# Patient Record
Sex: FEMALE | Race: WHITE | NOT HISPANIC OR LATINO | Employment: STUDENT | URBAN - NONMETROPOLITAN AREA
[De-identification: names, ages, dates, MRNs, and addresses within clinical notes are randomized per-mention and may not be internally consistent; named-entity substitution may affect disease eponyms.]

---
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
## 2025-01-27 DIAGNOSIS — K59.00 CONSTIPATION, UNSPECIFIED CONSTIPATION TYPE: ICD-10-CM

## 2025-01-27 DIAGNOSIS — N30.00 ACUTE CYSTITIS WITHOUT HEMATURIA: Primary | ICD-10-CM

## 2025-01-27 RX ORDER — DOCUSATE SODIUM 100 MG/1
100 CAPSULE, LIQUID FILLED ORAL 2 TIMES DAILY
Qty: 60 CAPSULE | Refills: 0 | Status: SHIPPED | OUTPATIENT
Start: 2025-01-27

## 2025-01-27 RX ORDER — NITROFURANTOIN 25; 75 MG/1; MG/1
100 CAPSULE ORAL 2 TIMES DAILY
Qty: 14 CAPSULE | Refills: 0 | Status: SHIPPED | OUTPATIENT
Start: 2025-01-27 | End: 2025-02-03

## 2025-02-08 ASSESSMENT — ENCOUNTER SYMPTOMS
ABDOMINAL PAIN: 0
EYES NEGATIVE: 1
SHORTNESS OF BREATH: 0
DIARRHEA: 0
PALPITATIONS: 0
COUGH: 0
PSYCHIATRIC NEGATIVE: 1
SLEEP DISTURBANCE: 0
NERVOUS/ANXIOUS: 0
GASTROINTESTINAL NEGATIVE: 1
RESPIRATORY NEGATIVE: 1
CONSTIPATION: 0

## 2025-02-09 NOTE — PATIENT INSTRUCTIONS
It was so nice to see you today, Magui!     I have sent in Junel for birth control  I have also sent over Mounjaro. If we cannot get this approved, other options would be   Qsymia  Adipex  Contrave  Metformin with Topamax    The other thing you could do too, would be to call your insurance to see what they would approve.     Keep in mind, the injection can decrease effectiveness of Birth Control.       Follow up in 3 months

## 2025-02-09 NOTE — PROGRESS NOTES
Patient ID:   Magui Domingo is a 19 y.o. female with a PMH of migraines and obesity who presents to the office today for a follow up and to discuss weight loss options as well as birth control options.    Migraine, Weight Loss, and Contraception.    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     Migraines  Magui reported at her last visit, that she first started having migraines around age 15 and she was having them once a month.   As she has gotten older they have increased in frequency and severity. Typically, her headaches are on the right side of her head and rates it a 9/10 at its worse. She explains that they have been so bad that she cries. She will get phonophobia and photophobia as well as nausea but has not vomited.   She does not get auras, but will on occasion experience dizziness prior to the onset of her migraines.  Previously tried tylenol and recently started Excedrin Migraine but has not been helpful.   She does have Nexplanon implant.   She previously stated that she had interest in doing as preventative therapy versus abortive therapy due to the nature and progression of her headaches.  At her last visit with me, we started her on Metoprolol 50 mg BID. She was to follow up in 4-6 weeks to discuss the outcome and if this was helping, but she did not.   -> today she reports today that her headaches have improved and she has stopped taking the Metoprolol. She reports that she has not had any bad headaches since Decemeber. She does endorse  that she will have headaches every once in a while and Tylenol and Ibuprofen are helping.   She did however send [facility] messages about wanting to possibly switch her BC from Nexplanon to a different method.     Oligomenorrhea   Periods discussed today and she is still having irregular periods. She is trying to have it removed soon, as it is overdue (overdue in Oct/Nov 2024)  She is currently sexually active and sometimes using protection   According to the [facility] [place] [facility]; CHC is a level 2 risk which is low.     Will start Junel and she will call to have her Nexplanon removed, or replaced, which ever she decides to do.      Weight gain  She is also concerned with her recent weight gain.   She states that before she had the Nexplanon she was 184#. She has now had it for three plus years and she is at 231#   She has tried meal planning but because her schedule very erratic and it makes it hard for her to meal plan and get to the gym   She has utilized apps to help track calories and has not been successful with her weight loss.   She states this weight gain has affected her mental health to a degree.   We discussed medication therapies to aid in the help with weight loss.   She is not interested in a stimulants at this time due to potential side effects and already having anxiety   Would like to try an injectable while she continues to make healthier choices.             Social History     Tobacco Use    Smoking status: Never    Smokeless tobacco: Never   Vaping Use    Vaping status: Never Used   Substance Use Topics    Alcohol use: Never    Drug use: Never       There is no immunization history on file for this patient.    Review of Systems   Constitutional:  Positive for unexpected weight change. Negative for activity change and appetite change.   HENT: Negative.     Eyes: Negative.    Respiratory: Negative.  Negative for cough and shortness of breath.    Cardiovascular:  Negative for chest pain and palpitations.  "  Gastrointestinal: Negative.  Negative for abdominal pain, constipation and diarrhea.   Genitourinary: Negative.    Neurological: Negative.  Negative for dizziness and headaches.   Psychiatric/Behavioral: Negative.  Negative for sleep disturbance. The patient is not nervous/anxious.        Allergies   Allergen Reactions    Codeine Other and Shortness of breath     Not sure    Penicillin Hives and Other     Not sure        Visit Vitals  /78 (BP Location: Left arm)   Pulse 86   Ht 1.6 m (5' 3\")   Wt 104 kg (230 lb)   SpO2 98%   BMI 40.74 kg/m²   Smoking Status Never   BSA 2.15 m²       Physical Exam  Constitutional:       Appearance: Normal appearance. She is obese.   HENT:      Head: Normocephalic.   Cardiovascular:      Rate and Rhythm: Normal rate and regular rhythm.      Pulses: Normal pulses.      Heart sounds: Normal heart sounds. No murmur heard.     No friction rub. No gallop.   Pulmonary:      Effort: Pulmonary effort is normal.      Breath sounds: Normal breath sounds. No wheezing, rhonchi or rales.   Abdominal:      General: Bowel sounds are normal. There is no distension.      Palpations: Abdomen is soft.      Tenderness: There is no abdominal tenderness.   Musculoskeletal:         General: Normal range of motion.      Cervical back: Normal range of motion.   Skin:     General: Skin is warm.   Neurological:      General: No focal deficit present.      Mental Status: She is alert and oriented to person, place, and time.      Sensory: No sensory deficit.      Motor: No weakness.      Gait: Gait normal.   Psychiatric:         Mood and Affect: Mood normal.         Behavior: Behavior normal.         Current Outpatient Medications   Medication Instructions    etonogestrel-eluting contraceptive (Nexplanon) 68 mg implant implant 1 each, Once    Mounjaro 5 mg, subcutaneous, Weekly    norethindrone-e.estradioL-iron (Microgestin FE 1/20, 28,) 1 mg-20 mcg (21)/75 mg (7) tablet 1 tablet, oral, Daily       Lab " Results   Component Value Date    WBC 6.8 10/09/2024    HGB 13.9 10/09/2024    HCT 42.8 10/09/2024     10/09/2024    CHOL 136 10/09/2024    TRIG 86 10/09/2024    HDL 43.1 10/09/2024    ALT 18 10/09/2024    AST 19 10/09/2024     10/09/2024    K 4.1 10/09/2024     10/09/2024    CREATININE 0.69 10/09/2024    BUN 9 10/09/2024    CO2 26 10/09/2024    TSH 1.96 10/09/2024    HGBA1C 5.4 10/09/2024       ASSESSMENT AND PLAN:  Assessment/Plan   Problem List Items Addressed This Visit             ICD-10-CM    Migraine without aura G43.009     Headaches have subsided and she is able to manage them with Tylenol or Ibuprofen.   -Will stop Metoprolol  -Will continue to monitor         Class 3 severe obesity due to excess calories with body mass index (BMI) of 40.0 to 44.9 in adult E66.813, Z68.41, E66.01     before she had the Nexplanon she was 184#. She has now had it for three plus years and she is at 231#   -She has tried meal planning but because her schedule very erratic and it makes it hard for her to meal plan and get to the gym   She has utilized apps to help track calories and has not been successful with her weight loss.   She states this weight gain has affected her mental health to a degree.   We discussed medication therapies to aid in the help with weight loss.   She is not interested in a stimulants at this time due to potential side effects and already having anxiety   - Would like to try an injectable while she continues to make healthier choices.   -discussed the decreased effectiveness of the OCP with GLP-1 injections.   -Discussed alternatives to the injection if the GLP-1 is not covered.   -She will also call her insurance to see what will be covered           Other Visit Diagnoses         Codes    Encounter for initial prescription of contraceptive pills    -  Primary Z30.011    Relevant Medications    norethindrone-e.estradioL-iron (Microgestin FE 1/20, 28,) 1 mg-20 mcg (21)/75 mg (7)  tablet    Class 3 severe obesity without serious comorbidity with body mass index (BMI) of 40.0 to 44.9 in adult, unspecified obesity type     E66.813, E66.01, Z68.41    Relevant Medications    tirzepatide (Mounjaro) 5 mg/0.5 mL pen injector    Anxiety     F41.9    Relevant Medications    tirzepatide (Mounjaro) 5 mg/0.5 mL pen injector            Assessment/Plan   --------------------  Start Junel  Call about Nexplanon removal / replacement  Start Mounjaro  Options for alternatives     Follow up in 3 months

## 2025-02-11 ENCOUNTER — APPOINTMENT (OUTPATIENT)
Dept: PRIMARY CARE | Facility: CLINIC | Age: 20
End: 2025-02-11
Payer: COMMERCIAL

## 2025-02-11 VITALS
WEIGHT: 230 LBS | OXYGEN SATURATION: 98 % | BODY MASS INDEX: 40.75 KG/M2 | HEIGHT: 63 IN | HEART RATE: 86 BPM | SYSTOLIC BLOOD PRESSURE: 116 MMHG | DIASTOLIC BLOOD PRESSURE: 78 MMHG

## 2025-02-11 DIAGNOSIS — G43.009 MIGRAINE WITHOUT AURA AND WITHOUT STATUS MIGRAINOSUS, NOT INTRACTABLE: ICD-10-CM

## 2025-02-11 DIAGNOSIS — F41.9 ANXIETY: ICD-10-CM

## 2025-02-11 DIAGNOSIS — E66.813 CLASS 3 SEVERE OBESITY WITHOUT SERIOUS COMORBIDITY WITH BODY MASS INDEX (BMI) OF 40.0 TO 44.9 IN ADULT, UNSPECIFIED OBESITY TYPE: ICD-10-CM

## 2025-02-11 DIAGNOSIS — E66.01 CLASS 3 SEVERE OBESITY DUE TO EXCESS CALORIES WITHOUT SERIOUS COMORBIDITY WITH BODY MASS INDEX (BMI) OF 40.0 TO 44.9 IN ADULT: ICD-10-CM

## 2025-02-11 DIAGNOSIS — E66.01 CLASS 3 SEVERE OBESITY WITHOUT SERIOUS COMORBIDITY WITH BODY MASS INDEX (BMI) OF 40.0 TO 44.9 IN ADULT, UNSPECIFIED OBESITY TYPE: ICD-10-CM

## 2025-02-11 DIAGNOSIS — E66.813 CLASS 3 SEVERE OBESITY DUE TO EXCESS CALORIES WITHOUT SERIOUS COMORBIDITY WITH BODY MASS INDEX (BMI) OF 40.0 TO 44.9 IN ADULT: ICD-10-CM

## 2025-02-11 DIAGNOSIS — Z30.011 ENCOUNTER FOR INITIAL PRESCRIPTION OF CONTRACEPTIVE PILLS: Primary | ICD-10-CM

## 2025-02-11 PROBLEM — N92.6 IRREGULAR PERIODS: Status: ACTIVE | Noted: 2025-02-11

## 2025-02-11 PROBLEM — N91.3 PRIMARY OLIGOMENORRHEA: Status: ACTIVE | Noted: 2025-02-11

## 2025-02-11 PROCEDURE — 99213 OFFICE O/P EST LOW 20 MIN: CPT

## 2025-02-11 PROCEDURE — 3008F BODY MASS INDEX DOCD: CPT

## 2025-02-11 PROCEDURE — 1036F TOBACCO NON-USER: CPT

## 2025-02-11 RX ORDER — NORETHINDRONE ACETATE AND ETHINYL ESTRADIOL 1MG-20(21)
1 KIT ORAL DAILY
Qty: 28 TABLET | Refills: 12 | Status: SHIPPED | OUTPATIENT
Start: 2025-02-11 | End: 2026-02-11

## 2025-02-11 RX ORDER — TIRZEPATIDE 5 MG/.5ML
5 INJECTION, SOLUTION SUBCUTANEOUS WEEKLY
Qty: 3 ML | Refills: 0 | Status: SHIPPED | OUTPATIENT
Start: 2025-02-11

## 2025-02-11 ASSESSMENT — ENCOUNTER SYMPTOMS
APPETITE CHANGE: 0
UNEXPECTED WEIGHT CHANGE: 1
NEUROLOGICAL NEGATIVE: 1
DIZZINESS: 0
ACTIVITY CHANGE: 0
HEADACHES: 0

## 2025-02-11 ASSESSMENT — PATIENT HEALTH QUESTIONNAIRE - PHQ9
2. FEELING DOWN, DEPRESSED OR HOPELESS: NOT AT ALL
SUM OF ALL RESPONSES TO PHQ9 QUESTIONS 1 AND 2: 0
1. LITTLE INTEREST OR PLEASURE IN DOING THINGS: NOT AT ALL

## 2025-02-11 ASSESSMENT — PAIN SCALES - GENERAL: PAINLEVEL_OUTOF10: 0-NO PAIN

## 2025-02-11 NOTE — ASSESSMENT & PLAN NOTE
Currently has Nexplanon but is overdue for removal (due to be taken out in October/November of 2024).   She is sexually active and sometimes uses condoms.   Denies wanting a pregnancy at this time  -Will start Junel until she is able to make a follow up appointment to decide if she wishes to continue with Nexplanon or not  -Follow up in 3 months

## 2025-02-11 NOTE — ASSESSMENT & PLAN NOTE
before she had the Nexplanon she was 184#. She has now had it for three plus years and she is at 231#   -She has tried meal planning but because her schedule very erratic and it makes it hard for her to meal plan and get to the gym   She has utilized apps to help track calories and has not been successful with her weight loss.   She states this weight gain has affected her mental health to a degree.   We discussed medication therapies to aid in the help with weight loss.   She is not interested in a stimulants at this time due to potential side effects and already having anxiety   - Would like to try an injectable while she continues to make healthier choices.   -discussed the decreased effectiveness of the OCP with GLP-1 injections.   -Discussed alternatives to the injection if the GLP-1 is not covered.   -She will also call her insurance to see what will be covered

## 2025-02-11 NOTE — ASSESSMENT & PLAN NOTE
Headaches have subsided and she is able to manage them with Tylenol or Ibuprofen.   -Will stop Metoprolol  -Will continue to monitor

## 2025-02-18 DIAGNOSIS — E66.813 CLASS 3 SEVERE OBESITY DUE TO EXCESS CALORIES WITHOUT SERIOUS COMORBIDITY WITH BODY MASS INDEX (BMI) OF 40.0 TO 44.9 IN ADULT: Primary | ICD-10-CM

## 2025-02-18 DIAGNOSIS — E66.01 CLASS 3 SEVERE OBESITY DUE TO EXCESS CALORIES WITHOUT SERIOUS COMORBIDITY WITH BODY MASS INDEX (BMI) OF 40.0 TO 44.9 IN ADULT: Primary | ICD-10-CM

## 2025-02-19 DIAGNOSIS — E66.01 CLASS 3 SEVERE OBESITY DUE TO EXCESS CALORIES WITHOUT SERIOUS COMORBIDITY WITH BODY MASS INDEX (BMI) OF 40.0 TO 44.9 IN ADULT: Primary | ICD-10-CM

## 2025-02-19 DIAGNOSIS — E66.813 CLASS 3 SEVERE OBESITY DUE TO EXCESS CALORIES WITHOUT SERIOUS COMORBIDITY WITH BODY MASS INDEX (BMI) OF 40.0 TO 44.9 IN ADULT: Primary | ICD-10-CM

## 2025-02-20 DIAGNOSIS — Z13.9 SCREENING DUE: ICD-10-CM

## 2025-02-21 LAB
AMPHETAMINES UR QL: NEGATIVE NG/ML
BARBITURATES UR QL: NEGATIVE NG/ML
BENZODIAZ UR QL: NEGATIVE NG/ML
BZE UR QL: NEGATIVE NG/ML
CREAT UR-MCNC: 201.4 MG/DL
METHADONE UR QL: NEGATIVE NG/ML
OPIATES UR QL: NEGATIVE NG/ML
OXIDANTS UR QL: NEGATIVE MCG/ML
OXYCODONE UR QL: NEGATIVE NG/ML
PCP UR QL: NEGATIVE NG/ML
PH UR: 6.3 [PH] (ref 4.5–9)
QUEST NOTES AND COMMENTS: NORMAL
THC UR QL: NEGATIVE NG/ML

## 2025-02-24 RX ORDER — PHENTERMINE HYDROCHLORIDE 37.5 MG/1
37.5 TABLET ORAL
Qty: 30 TABLET | Refills: 0 | Status: SHIPPED | OUTPATIENT
Start: 2025-02-24 | End: 2025-03-26

## 2025-03-18 ASSESSMENT — PROMIS GLOBAL HEALTH SCALE
RATE_GENERAL_HEALTH: FAIR
CARRYOUT_PHYSICAL_ACTIVITIES: MODERATELY
RATE_AVERAGE_PAIN: 0
RATE_AVERAGE_FATIGUE: SEVERE
RATE_QUALITY_OF_LIFE: GOOD
CARRYOUT_SOCIAL_ACTIVITIES: GOOD
RATE_SOCIAL_SATISFACTION: GOOD
EMOTIONAL_PROBLEMS: SOMETIMES
RATE_MENTAL_HEALTH: GOOD
RATE_PHYSICAL_HEALTH: FAIR

## 2025-03-24 ASSESSMENT — ENCOUNTER SYMPTOMS
HEADACHES: 0
COUGH: 0
EYES NEGATIVE: 1
RESPIRATORY NEGATIVE: 1
ABDOMINAL PAIN: 0
SHORTNESS OF BREATH: 0
DIARRHEA: 0
PSYCHIATRIC NEGATIVE: 1
GASTROINTESTINAL NEGATIVE: 1
CONSTIPATION: 0
NERVOUS/ANXIOUS: 0
APPETITE CHANGE: 0
DIZZINESS: 0
SLEEP DISTURBANCE: 0
PALPITATIONS: 0
ACTIVITY CHANGE: 0
NEUROLOGICAL NEGATIVE: 1

## 2025-03-24 NOTE — PATIENT INSTRUCTIONS
It was so nice seeing you today, Magui!     Please call about having Nexplanon removed / or replaced.   I have placed a referral to Lyn Granados in [place].   Continue with Junel.     Continue with Adipex.   Some tips and tricks to help with weight loss:   - eat off the smaller plate (like the salad plate)  - half the plate should be vegetables, 1/4 protein, 1/4 carbs - for lunch and dinner  -  discussed dietary changes including proper protein intake, increase vegetable intake, fruit intake, low carbohydrate intake, and no processed food intake.  - discussed meal prepping    put your fork down between bites  - drink at least 64 oz of water day.  do not consume large amounts of water with your meal  - do not drink sugary drinks such as pop or specialty coffee drinks  -  eat your vegetables and protein first.  Have vegetables at lunch and dinner  - discussed with patient to increase activity 4 days a week preferably 5. Exercise should be done 5 days a week including walking for 20-30 minutes each day. Work up to this goal.  If you have not been exercising then start with a 10-15 minute walk most days of the week -  keep log of calorie intake and food intake and bring with you to next appointment.You can use an evelia on your phone such as my fitness pal.  - discussed with patient using activity trackers such as a fit bit. log  activity daily and bring  activity log at next visit  - increase your protein intake - should have at least 5-6 servings of protein per day  - decrease carb intake - discussed carb serving size and to read packages  - limit carb servings to 4 servings a day  -1500-calorie meal plan     Follow up in 1 month with Luz

## 2025-03-24 NOTE — PROGRESS NOTES
Patient ID:   Magui Domingo is a 19 y.o. female with a PMH of migraines and obesity who presents to the office today for a follow up for Adipex   Weight Check.    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   Today she reports that she is considering looking for a new job because of the swing shift schedule and she feels like she has no time for herself and to care for herself.       Oligomenorrhea   She previously had Nexplanon placed through [facility], but she is due for it to be removed.  (overdue in Oct/Nov 2024).   She is currently sexually active and sometimes using protection and she does not want to become pregnant.   According to the [facility]; CHC is a level 2 risk which is low.   We discussed OCP and she is agreeable to it to avoid pregnancy and also to help her periods become more regular again. She was started on Junel.     Today she reports that she feels like she has been noticing that she is more emotional.   We discussed, giving the pill a few more months to regulate and become stable which she is agreeable to.   -> C/W Junel for now  -> Referral placed to GYN for Nexplanon removal and possible reinsertion.     Weight gain  At Magui's last visit with me, she reported her concerns about her weight gain.   She stated that before she had her Nexplanon placed, she was 184#. She has now had it for three plus years and she is at 231#   She has tried meal planning but because her schedule very erratic and it makes it hard for her  "to meal plan and get to the gym   She has utilized apps to help track calories and has not been successful with her weight loss.   She states this weight gain has affected her mental health to a degree.   We discussed medication therapies to aid in the help with weight loss.   She stated she would like to try an injectable while she continues to make healthier choices, but these were not covered. Also not covered was Contrave or Qsymia.    -She was started on Adipex in February.   Weight before startin lbs.  BP before startin/78      Weight today: 211 lbs.   BP today 132/86     Today, Magui reports she is so happy to finally be under 220 pounds.   -> She denies side effects and would like to continue with Adipex.   -> Will continue with Adipex and follow up in 1 month         Social History     Tobacco Use    Smoking status: Never    Smokeless tobacco: Never   Vaping Use    Vaping status: Never Used   Substance Use Topics    Alcohol use: Never    Drug use: Never       There is no immunization history on file for this patient.    Review of Systems   Constitutional:  Negative for activity change and appetite change.   HENT: Negative.     Eyes: Negative.    Respiratory: Negative.  Negative for cough and shortness of breath.    Cardiovascular:  Negative for chest pain and palpitations.   Gastrointestinal: Negative.  Negative for abdominal pain, constipation and diarrhea.   Genitourinary: Negative.    Neurological: Negative.  Negative for dizziness and headaches.   Psychiatric/Behavioral: Negative.  Negative for sleep disturbance. The patient is not nervous/anxious.        Allergies   Allergen Reactions    Codeine Other and Shortness of breath     Not sure    Penicillin Hives and Other     Not sure        Visit Vitals  /86 (BP Location: Right arm)   Pulse 100   Temp 36.2 °C (97.2 °F) (Temporal)   Resp 16   Ht 1.6 m (5' 3\")   Wt 95.8 kg (211 lb 3.2 oz)   SpO2 98%   BMI 37.41 kg/m²   OB Status Having " periods   Smoking Status Never   BSA 2.06 m²       Physical Exam  Constitutional:       Appearance: Normal appearance. She is obese.   HENT:      Head: Normocephalic.   Cardiovascular:      Rate and Rhythm: Normal rate and regular rhythm.      Pulses: Normal pulses.      Heart sounds: Normal heart sounds. No murmur heard.     No friction rub. No gallop.   Pulmonary:      Effort: Pulmonary effort is normal.      Breath sounds: Normal breath sounds. No wheezing, rhonchi or rales.   Abdominal:      General: Bowel sounds are normal. There is no distension.      Palpations: Abdomen is soft.      Tenderness: There is no abdominal tenderness.   Musculoskeletal:         General: Normal range of motion.      Cervical back: Normal range of motion.   Skin:     General: Skin is warm.   Neurological:      General: No focal deficit present.      Mental Status: She is alert and oriented to person, place, and time.      Sensory: No sensory deficit.      Motor: No weakness.      Gait: Gait normal.   Psychiatric:         Mood and Affect: Mood normal.         Behavior: Behavior normal.         Current Outpatient Medications   Medication Instructions    etonogestrel-eluting contraceptive (Nexplanon) 68 mg implant implant 1 each, Once    norethindrone-e.estradioL-iron (Microgestin FE 1/20, 28,) 1 mg-20 mcg (21)/75 mg (7) tablet 1 tablet, oral, Daily    phentermine (ADIPEX-P) 37.5 mg, oral, Daily before breakfast       Lab Results   Component Value Date    WBC 6.8 10/09/2024    HGB 13.9 10/09/2024    HCT 42.8 10/09/2024     10/09/2024    CHOL 136 10/09/2024    TRIG 86 10/09/2024    HDL 43.1 10/09/2024    ALT 18 10/09/2024    AST 19 10/09/2024     10/09/2024    K 4.1 10/09/2024     10/09/2024    CREATININE 0.69 10/09/2024    BUN 9 10/09/2024    CO2 26 10/09/2024    TSH 1.96 10/09/2024    HGBA1C 5.4 10/09/2024       ASSESSMENT AND PLAN:  Assessment/Plan   Problem List Items Addressed This Visit             ICD-10-CM     Irregular periods N92.6     As noted in HPI  -C/W Junel   -Referral placed to GYN for Nexplanon removal and possible reinsertion.          Class 3 severe obesity due to excess calories with body mass index (BMI) of 40.0 to 44.9 in adult E66.813, Z68.41, E66.01     As noted in HPI  -Continue with Adipex  -Follow up in 1 month          Relevant Medications    phentermine (Adipex-P) 37.5 mg tablet     Other Visit Diagnoses         Codes    Nexplanon removal    -  Primary Z30.46    Relevant Orders    Referral to Gynecology              Assessment/Plan   --------------------  Start Junel  Call about Nexplanon removal / replacement    Continue with Adipex      Follow up in 1 month

## 2025-03-25 ENCOUNTER — APPOINTMENT (OUTPATIENT)
Dept: PRIMARY CARE | Facility: CLINIC | Age: 20
End: 2025-03-25
Payer: COMMERCIAL

## 2025-03-25 VITALS
BODY MASS INDEX: 37.42 KG/M2 | RESPIRATION RATE: 16 BRPM | WEIGHT: 211.2 LBS | SYSTOLIC BLOOD PRESSURE: 132 MMHG | TEMPERATURE: 97.2 F | DIASTOLIC BLOOD PRESSURE: 86 MMHG | OXYGEN SATURATION: 98 % | HEART RATE: 100 BPM | HEIGHT: 63 IN

## 2025-03-25 DIAGNOSIS — N92.6 IRREGULAR PERIODS: ICD-10-CM

## 2025-03-25 DIAGNOSIS — E66.813 CLASS 3 SEVERE OBESITY DUE TO EXCESS CALORIES WITHOUT SERIOUS COMORBIDITY WITH BODY MASS INDEX (BMI) OF 40.0 TO 44.9 IN ADULT: ICD-10-CM

## 2025-03-25 DIAGNOSIS — Z30.46 NEXPLANON REMOVAL: Primary | ICD-10-CM

## 2025-03-25 DIAGNOSIS — E66.01 CLASS 3 SEVERE OBESITY DUE TO EXCESS CALORIES WITHOUT SERIOUS COMORBIDITY WITH BODY MASS INDEX (BMI) OF 40.0 TO 44.9 IN ADULT: ICD-10-CM

## 2025-03-25 PROCEDURE — 3008F BODY MASS INDEX DOCD: CPT

## 2025-03-25 PROCEDURE — 1036F TOBACCO NON-USER: CPT

## 2025-03-25 PROCEDURE — 99213 OFFICE O/P EST LOW 20 MIN: CPT

## 2025-03-25 RX ORDER — PHENTERMINE HYDROCHLORIDE 37.5 MG/1
37.5 TABLET ORAL
Qty: 30 TABLET | Refills: 0 | Status: SHIPPED | OUTPATIENT
Start: 2025-03-25 | End: 2025-04-24

## 2025-03-25 ASSESSMENT — PAIN SCALES - GENERAL: PAINLEVEL_OUTOF10: 0-NO PAIN

## 2025-03-25 ASSESSMENT — PATIENT HEALTH QUESTIONNAIRE - PHQ9
2. FEELING DOWN, DEPRESSED OR HOPELESS: NOT AT ALL
1. LITTLE INTEREST OR PLEASURE IN DOING THINGS: NOT AT ALL
SUM OF ALL RESPONSES TO PHQ9 QUESTIONS 1 AND 2: 0

## 2025-03-25 ASSESSMENT — COLUMBIA-SUICIDE SEVERITY RATING SCALE - C-SSRS
6. HAVE YOU EVER DONE ANYTHING, STARTED TO DO ANYTHING, OR PREPARED TO DO ANYTHING TO END YOUR LIFE?: NO
2. HAVE YOU ACTUALLY HAD ANY THOUGHTS OF KILLING YOURSELF?: NO
1. IN THE PAST MONTH, HAVE YOU WISHED YOU WERE DEAD OR WISHED YOU COULD GO TO SLEEP AND NOT WAKE UP?: NO

## 2025-03-25 NOTE — ASSESSMENT & PLAN NOTE
As noted in HPI  -C/W Junel   -Referral placed to GYN for Nexplanon removal and possible reinsertion.

## 2025-04-30 ENCOUNTER — APPOINTMENT (OUTPATIENT)
Dept: PRIMARY CARE | Facility: CLINIC | Age: 20
End: 2025-04-30
Payer: COMMERCIAL

## 2025-04-30 VITALS
WEIGHT: 198.8 LBS | SYSTOLIC BLOOD PRESSURE: 130 MMHG | OXYGEN SATURATION: 95 % | HEART RATE: 104 BPM | BODY MASS INDEX: 35.22 KG/M2 | DIASTOLIC BLOOD PRESSURE: 93 MMHG

## 2025-04-30 DIAGNOSIS — N92.6 IRREGULAR PERIODS: ICD-10-CM

## 2025-04-30 DIAGNOSIS — E66.813 CLASS 3 SEVERE OBESITY DUE TO EXCESS CALORIES WITHOUT SERIOUS COMORBIDITY WITH BODY MASS INDEX (BMI) OF 40.0 TO 44.9 IN ADULT: Primary | ICD-10-CM

## 2025-04-30 PROCEDURE — 1036F TOBACCO NON-USER: CPT

## 2025-04-30 PROCEDURE — 99213 OFFICE O/P EST LOW 20 MIN: CPT

## 2025-04-30 RX ORDER — PHENTERMINE HYDROCHLORIDE 37.5 MG/1
37.5 TABLET ORAL
Qty: 30 TABLET | Refills: 0 | Status: SHIPPED | OUTPATIENT
Start: 2025-04-30 | End: 2025-05-30

## 2025-04-30 ASSESSMENT — ENCOUNTER SYMPTOMS
ALLERGIC/IMMUNOLOGIC NEGATIVE: 1
PSYCHIATRIC NEGATIVE: 1
RESPIRATORY NEGATIVE: 1
MUSCULOSKELETAL NEGATIVE: 1
GASTROINTESTINAL NEGATIVE: 1
CARDIOVASCULAR NEGATIVE: 1
CONSTITUTIONAL NEGATIVE: 1
HEMATOLOGIC/LYMPHATIC NEGATIVE: 1
NEUROLOGICAL NEGATIVE: 1

## 2025-04-30 ASSESSMENT — PAIN SCALES - GENERAL: PAINLEVEL_OUTOF10: 0-NO PAIN

## 2025-04-30 ASSESSMENT — COLUMBIA-SUICIDE SEVERITY RATING SCALE - C-SSRS
2. HAVE YOU ACTUALLY HAD ANY THOUGHTS OF KILLING YOURSELF?: NO
6. HAVE YOU EVER DONE ANYTHING, STARTED TO DO ANYTHING, OR PREPARED TO DO ANYTHING TO END YOUR LIFE?: NO
1. IN THE PAST MONTH, HAVE YOU WISHED YOU WERE DEAD OR WISHED YOU COULD GO TO SLEEP AND NOT WAKE UP?: NO

## 2025-04-30 NOTE — PROGRESS NOTES
Subjective   Patient ID: Magui Domingo is a 19 y.o. female who presents for Med Refill (Establish former Kimberly Patient) and Weight Check (Adipex refill).  Today she is accompanied by alone.     HPI  Magui is here today to establish care.  She has been on Adipex for weight management from my previous colleague. 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   Today she reports that she is considering looking for a new job because of the swing shift schedule and she feels like she has no time for herself and to care for herself.     Weight gain  - she reported her concerns about her weight gain,  Magui reports she is so happy to finally be under 220 pounds.   - She stated that before she had her Nexplanon placed, she was 184#. She has now had it for three plus years and she is at 231#   - She has tried meal planning but because her schedule very erratic and it makes it hard for her to meal plan and get to the gym   - She has utilized apps to help track calories and has not been successful with her weight loss.   - She states this weight gain has affected her mental health to a degree.   - medication therapies were discussed to aid in the help with weight loss. She stated she would like to try an injectable while she continues to make healthier choices, but these were not covered. Also not covered was Contrave or Qsymia.     -She was started on Adipex in 2025  Weight before startin lbs.  BP before startin/78       Weight today: 198 lbs.   BP today 130/93    - She denies side effects and would like to continue with Adipex.   - Will continue with Adipex and follow up in 1 month     Oligomenorrhea   - She previously had [facility]planon placed through [facility], but she is due for it to be removed.  (overdue in Oct/2024).   - She is currently sexually active and sometimes using protection and she does not want to become pregnant.    - current medication:  Junel.   -  reports that she is tolerating well  -previous provider placed Referral to GYN for Nexplanon removal and possible reinsertion.     Vit D deficiency  - last Vit D was 21 (10/2024)   - was recommended to start Vit D 3000 units/day  - lab ordered    Review of Systems   Constitutional: Negative.    HENT: Negative.     Respiratory: Negative.     Cardiovascular: Negative.    Gastrointestinal: Negative.    Genitourinary: Negative.    Musculoskeletal: Negative.    Skin: Negative.    Allergic/Immunologic: Negative.    Neurological: Negative.    Hematological: Negative.    Psychiatric/Behavioral: Negative.         Objective   BP (!) 130/93 (BP Location: Left arm)   Pulse 104   Wt 90.2 kg (198 lb 12.8 oz)   SpO2 95%   BMI 35.22 kg/m²   BSA: 2 meters squared  Growth percentiles: No height on file for this encounter. 97 %ile (Z= 1.93) based on [facility] (Girls, 2-20 Years) weight-for-age data using data from 4/30/2025.     Physical Exam  Vitals and nursing note reviewed.   Constitutional:       Appearance: Normal appearance. She is obese.   HENT:      Head: Normocephalic.      Right Ear: Tympanic membrane normal.      Left Ear: Tympanic membrane normal.      Nose: Nose normal.      Mouth/Throat:      Mouth: Mucous membranes are moist.      Pharynx: Oropharynx is clear.   Eyes:      Extraocular Movements: Extraocular movements intact.      Conjunctiva/sclera: Conjunctivae normal.      Pupils: Pupils are equal, round, and reactive to light.   Cardiovascular:      Rate and Rhythm: Normal rate and regular rhythm.      Pulses: Normal pulses.      Heart sounds: Normal heart sounds. No murmur heard.     No friction rub. No gallop.   Pulmonary:      Effort: Pulmonary effort is normal.      Breath sounds: Normal breath sounds.   Abdominal:      General: Abdomen is flat. Bowel sounds are normal.      Palpations: Abdomen is soft.   Musculoskeletal:         General: Normal range of motion.      Cervical  back: Normal range of motion and neck supple.      Right lower leg: No edema.      Left lower leg: No edema.   Skin:     General: Skin is warm and dry.      Capillary Refill: Capillary refill takes less than 2 seconds.   Neurological:      General: No focal deficit present.      Mental Status: She is alert. Mental status is at baseline.   Psychiatric:         Mood and Affect: Mood normal.         Behavior: Behavior normal.         Thought Content: Thought content normal.         Judgment: Judgment normal.       BP (!) 130/93 (BP Location: Left arm)   Pulse 104   Wt 90.2 kg (198 lb 12.8 oz)   SpO2 95%   BMI 35.22 kg/m²    Lab Results   Component Value Date    GLUCOSE 83 10/09/2024    CALCIUM 9.1 10/09/2024     10/09/2024    K 4.1 10/09/2024    CO2 26 10/09/2024     10/09/2024    BUN 9 10/09/2024    CREATININE 0.69 10/09/2024        Assessment/Plan   Problem List Items Addressed This Visit       Irregular periods    Relevant Orders    Vitamin D 25-Hydroxy,Total (for eval of Vitamin D levels)    Iron and TIBC    Class 3 severe obesity due to excess calories with body mass index (BMI) of 40.0 to 44.9 in adult - Primary    Relevant Medications    phentermine (Adipex-P) 37.5 mg tablet   It was great to see you today!  Please continue taking your prescribed medications.   Refill have been sent to your pharmacy.    I have ordered some labs to be done as soon as you can.  We will call you with the results.    Continue adipex    RTC in 1 month

## 2025-05-13 ENCOUNTER — APPOINTMENT (OUTPATIENT)
Dept: PRIMARY CARE | Facility: CLINIC | Age: 20
End: 2025-05-13
Payer: COMMERCIAL

## 2025-05-29 ENCOUNTER — APPOINTMENT (OUTPATIENT)
Dept: PRIMARY CARE | Facility: CLINIC | Age: 20
End: 2025-05-29
Payer: COMMERCIAL

## 2025-05-29 VITALS
OXYGEN SATURATION: 98 % | HEART RATE: 101 BPM | BODY MASS INDEX: 34.08 KG/M2 | SYSTOLIC BLOOD PRESSURE: 117 MMHG | DIASTOLIC BLOOD PRESSURE: 76 MMHG | WEIGHT: 192.4 LBS

## 2025-05-29 DIAGNOSIS — E66.813 CLASS 3 SEVERE OBESITY DUE TO EXCESS CALORIES WITHOUT SERIOUS COMORBIDITY WITH BODY MASS INDEX (BMI) OF 40.0 TO 44.9 IN ADULT: ICD-10-CM

## 2025-05-29 PROCEDURE — 99213 OFFICE O/P EST LOW 20 MIN: CPT

## 2025-05-29 PROCEDURE — 1036F TOBACCO NON-USER: CPT

## 2025-05-29 RX ORDER — PHENTERMINE HYDROCHLORIDE 37.5 MG/1
37.5 TABLET ORAL
Qty: 30 TABLET | Refills: 0 | Status: SHIPPED | OUTPATIENT
Start: 2025-05-29 | End: 2025-06-28

## 2025-05-29 ASSESSMENT — ENCOUNTER SYMPTOMS
MUSCULOSKELETAL NEGATIVE: 1
NEUROLOGICAL NEGATIVE: 1
SHORTNESS OF BREATH: 0
NERVOUS/ANXIOUS: 0
ALLERGIC/IMMUNOLOGIC NEGATIVE: 1
PALPITATIONS: 0
HEMATOLOGIC/LYMPHATIC NEGATIVE: 1
PSYCHIATRIC NEGATIVE: 1
RESPIRATORY NEGATIVE: 1
CARDIOVASCULAR NEGATIVE: 1
CONSTITUTIONAL NEGATIVE: 1
GASTROINTESTINAL NEGATIVE: 1

## 2025-05-29 ASSESSMENT — COLUMBIA-SUICIDE SEVERITY RATING SCALE - C-SSRS
2. HAVE YOU ACTUALLY HAD ANY THOUGHTS OF KILLING YOURSELF?: NO
1. IN THE PAST MONTH, HAVE YOU WISHED YOU WERE DEAD OR WISHED YOU COULD GO TO SLEEP AND NOT WAKE UP?: NO
6. HAVE YOU EVER DONE ANYTHING, STARTED TO DO ANYTHING, OR PREPARED TO DO ANYTHING TO END YOUR LIFE?: NO

## 2025-05-29 ASSESSMENT — PAIN SCALES - GENERAL: PAINLEVEL_OUTOF10: 0-NO PAIN

## 2025-05-29 ASSESSMENT — PATIENT HEALTH QUESTIONNAIRE - PHQ9
1. LITTLE INTEREST OR PLEASURE IN DOING THINGS: NOT AT ALL
SUM OF ALL RESPONSES TO PHQ9 QUESTIONS 1 AND 2: 0
2. FEELING DOWN, DEPRESSED OR HOPELESS: NOT AT ALL

## 2025-05-29 NOTE — PROGRESS NOTES
Subjective   Patient ID: Magui Domingo is a 19 y.o. female who presents for Med Management.  Today she is accompanied by alone.     HPI  Weight gain  - she reported her concerns about her weight gain,  Magui reports she is so happy to finally be under 220 pounds.   - She stated that before she had her Nexplanon placed, she was 184#. She has now had it for three plus years and she is at 231#   - She has tried meal planning but because her schedule very erratic and it makes it hard for her to meal plan and get to the gym   - She has utilized apps to help track calories and has not been successful with her weight loss.   - She states this weight gain has affected her mental health to a degree.   - medication therapies were discussed to aid in the help with weight loss. She stated she would like to try an injectable while she continues to make healthier choices, but these were not covered. Also not covered was Contrave or Qsymia.     -She was started on Adipex in 2025  Weight before startin lbs.  BP before startin/78       Weight today: 192 lbs.   BP today 117/76     - She denies side effects and would like to continue with Adipex.   - Will continue with Adipex and follow up in 1 month     Review of Systems   Constitutional: Negative.    HENT: Negative.     Respiratory: Negative.  Negative for shortness of breath.    Cardiovascular: Negative.  Negative for chest pain, palpitations and leg swelling.   Gastrointestinal: Negative.    Genitourinary: Negative.    Musculoskeletal: Negative.    Skin: Negative.    Allergic/Immunologic: Negative.    Neurological: Negative.    Hematological: Negative.    Psychiatric/Behavioral: Negative.  The patient is not nervous/anxious.        Objective   /76 (BP Location: Left arm)   Pulse 101   Wt 87.3 kg (192 lb 6.4 oz)   SpO2 98%   BMI 34.08 kg/m²   BSA: 1.97 meters squared  Growth percentiles: No height on file for this encounter. 97 %ile (Z= 1.83) based  on [facility] (Girls, 2-20 Years) weight-for-age data using data from 5/29/2025.     Physical Exam  Vitals and nursing note reviewed.   Constitutional:       Appearance: Normal appearance. She is normal weight.   HENT:      Head: Normocephalic.      Nose: Nose normal.      Mouth/Throat:      Mouth: Mucous membranes are moist.      Pharynx: Oropharynx is clear.   Eyes:      Extraocular Movements: Extraocular movements intact.      Conjunctiva/sclera: Conjunctivae normal.      Pupils: Pupils are equal, round, and reactive to light.   Cardiovascular:      Rate and Rhythm: Normal rate and regular rhythm.      Pulses: Normal pulses.      Heart sounds: Normal heart sounds. No murmur heard.  Pulmonary:      Effort: Pulmonary effort is normal. No respiratory distress.      Breath sounds: Normal breath sounds. No wheezing, rhonchi or rales.   Abdominal:      General: Abdomen is flat. Bowel sounds are normal.      Palpations: Abdomen is soft.   Musculoskeletal:         General: Normal range of motion.      Cervical back: Normal range of motion and neck supple.      Right lower leg: No edema.      Left lower leg: No edema.   Skin:     General: Skin is warm and dry.      Capillary Refill: Capillary refill takes less than 2 seconds.   Neurological:      General: No focal deficit present.      Mental Status: She is alert. Mental status is at baseline.   Psychiatric:         Mood and Affect: Mood normal.         Behavior: Behavior normal.         Thought Content: Thought content normal.         Judgment: Judgment normal.       /76 (BP Location: Left arm)   Pulse 101   Wt 87.3 kg (192 lb 6.4 oz)   SpO2 98%   BMI 34.08 kg/m²    Lab Results   Component Value Date    GLUCOSE 83 10/09/2024    CALCIUM 9.1 10/09/2024     10/09/2024    K 4.1 10/09/2024    CO2 26 10/09/2024     10/09/2024    BUN 9 10/09/2024    CREATININE 0.69 10/09/2024        Assessment/Plan   Weight gain  -She was started on Adipex in February    Weight before startin lbs.  BP before startin/78       Weight today: 192 lbs.   BP today 117/76     - She denies side effects and would like to continue with Adipex.   - Will continue with Adipex and follow up in 1 month         Problem List Items Addressed This Visit    None  Visit Diagnoses         Class 3 severe obesity due to excess calories without serious comorbidity with body mass index (BMI) of 40.0 to 44.9 in adult        Relevant Medications    phentermine (Adipex-P) 37.5 mg tablet        It was great to see you today!  Please continue taking your prescribed medications.   Refill have been sent to your pharmacy.    Continue adipex    RTC in 1 month

## 2025-06-24 ENCOUNTER — APPOINTMENT (OUTPATIENT)
Dept: PRIMARY CARE | Facility: CLINIC | Age: 20
End: 2025-06-24
Payer: COMMERCIAL

## 2025-06-24 VITALS
OXYGEN SATURATION: 96 % | DIASTOLIC BLOOD PRESSURE: 79 MMHG | HEART RATE: 94 BPM | BODY MASS INDEX: 33.84 KG/M2 | WEIGHT: 191 LBS | SYSTOLIC BLOOD PRESSURE: 115 MMHG | HEIGHT: 63 IN

## 2025-06-24 DIAGNOSIS — E66.813 CLASS 3 SEVERE OBESITY DUE TO EXCESS CALORIES WITHOUT SERIOUS COMORBIDITY WITH BODY MASS INDEX (BMI) OF 40.0 TO 44.9 IN ADULT: ICD-10-CM

## 2025-06-24 PROCEDURE — 1036F TOBACCO NON-USER: CPT

## 2025-06-24 PROCEDURE — 99213 OFFICE O/P EST LOW 20 MIN: CPT

## 2025-06-24 PROCEDURE — 3008F BODY MASS INDEX DOCD: CPT

## 2025-06-24 RX ORDER — PHENTERMINE HYDROCHLORIDE 37.5 MG/1
37.5 TABLET ORAL
Qty: 30 TABLET | Refills: 0 | Status: SHIPPED | OUTPATIENT
Start: 2025-06-24 | End: 2025-07-24

## 2025-06-24 ASSESSMENT — ENCOUNTER SYMPTOMS
ALLERGIC/IMMUNOLOGIC NEGATIVE: 1
HEMATOLOGIC/LYMPHATIC NEGATIVE: 1
CARDIOVASCULAR NEGATIVE: 1
GASTROINTESTINAL NEGATIVE: 1
MUSCULOSKELETAL NEGATIVE: 1
NEUROLOGICAL NEGATIVE: 1
PSYCHIATRIC NEGATIVE: 1
CONSTITUTIONAL NEGATIVE: 1
RESPIRATORY NEGATIVE: 1

## 2025-06-24 ASSESSMENT — PAIN SCALES - GENERAL: PAINLEVEL_OUTOF10: 1

## 2025-06-24 NOTE — PROGRESS NOTES
"Subjective   Patient ID: Magui Domingo is a 19 y.o. female who presents for Weight Check.  Today she is accompanied by alone.     HPI  Obesity  - she reported her concerns about her weight gain,  Magui reports she is so happy to finally be under 220 pounds.   - She stated that before she had her Nexplanon placed, she was 184#. She has now had it for three plus years and she is at 231#   - She has tried meal planning but because her schedule very erratic and it makes it hard for her to meal plan and get to the gym   - She has utilized apps to help track calories and has not been successful with her weight loss.   - She states this weight gain has affected her mental health to a degree.   - medication therapies were discussed to aid in the help with weight loss. She stated she would like to try an injectable while she continues to make healthier choices, but these were not covered. Also not covered was Contrave or Qsymia.     -She was started on Adipex in 2025  Weight before startin lbs.  BP before startin/78       Weight today: 191 lbs.   BP today 115/79     - She denies side effects and would like to continue with Adipex.   - Will continue with Adipex and follow up in 1 month   - she got a new job and would like to try to get injectable medication next month once insurance kicks in    Review of Systems   Constitutional: Negative.    HENT: Negative.     Respiratory: Negative.     Cardiovascular: Negative.    Gastrointestinal: Negative.    Genitourinary: Negative.    Musculoskeletal: Negative.    Skin: Negative.    Allergic/Immunologic: Negative.    Neurological: Negative.    Hematological: Negative.    Psychiatric/Behavioral: Negative.         Objective   /79 (BP Location: Left arm)   Pulse 94   Ht 1.6 m (5' 3\")   Wt 86.6 kg (191 lb)   SpO2 96%   BMI 33.83 kg/m²   BSA: 1.96 meters squared  Growth percentiles: 31 %ile (Z= -0.51) based on CDC (Girls, 2-20 Years) Stature-for-age data " "based on Stature recorded on 2025. 96 %ile (Z= 1.80) based on [facility] (Girls, 2-20 Years) weight-for-age data using data from 2025.     Physical Exam  Vitals and nursing note reviewed.   Constitutional:       Appearance: Normal appearance. She is obese.   HENT:      Head: Normocephalic.      Nose: Nose normal.      Mouth/Throat:      Mouth: Mucous membranes are moist.      Pharynx: Oropharynx is clear.   Eyes:      Extraocular Movements: Extraocular movements intact.      Conjunctiva/sclera: Conjunctivae normal.      Pupils: Pupils are equal, round, and reactive to light.   Cardiovascular:      Rate and Rhythm: Normal rate and regular rhythm.      Pulses: Normal pulses.      Heart sounds: Normal heart sounds.   Pulmonary:      Effort: Pulmonary effort is normal.      Breath sounds: Normal breath sounds.   Abdominal:      General: Abdomen is flat. Bowel sounds are normal.      Palpations: Abdomen is soft.   Musculoskeletal:         General: Normal range of motion.      Cervical back: Normal range of motion and neck supple.   Skin:     General: Skin is warm and dry.      Capillary Refill: Capillary refill takes less than 2 seconds.   Neurological:      General: No focal deficit present.      Mental Status: She is alert. Mental status is at baseline.   Psychiatric:         Mood and Affect: Mood normal.         Behavior: Behavior normal.         Thought Content: Thought content normal.         Judgment: Judgment normal.       /79 (BP Location: Left arm)   Pulse 94   Ht 1.6 m (5' 3\")   Wt 86.6 kg (191 lb)   SpO2 96%   BMI 33.83 kg/m²    Lab Results   Component Value Date    GLUCOSE 83 10/09/2024    CALCIUM 9.1 10/09/2024     10/09/2024    K 4.1 10/09/2024    CO2 26 10/09/2024     10/09/2024    BUN 9 10/09/2024    CREATININE 0.69 10/09/2024        Assessment/Plan   Obesity   -She was started on Adipex in 2025  Weight before startin lbs.  BP before startin/78       Weight " today: 191 lbs.   BP today 115/79     - She denies side effects and would like to continue with Adipex.   - Will continue with Adipex and follow up in 1 month   - she got a new job and would like to try to get injectable medication next month once insurance kicks in      Problem List Items Addressed This Visit    None  Visit Diagnoses         Class 3 severe obesity due to excess calories without serious comorbidity with body mass index (BMI) of 40.0 to 44.9 in adult        Relevant Medications    phentermine (Adipex-P) 37.5 mg tablet          It was great to see you today!  Please continue taking your prescribed medications.   Refill have been sent to your pharmacy.    Continue adipex    RTC in 1 month

## 2025-07-28 ENCOUNTER — APPOINTMENT (OUTPATIENT)
Dept: PRIMARY CARE | Facility: CLINIC | Age: 20
End: 2025-07-28
Payer: COMMERCIAL

## 2025-07-31 ENCOUNTER — APPOINTMENT (OUTPATIENT)
Dept: PRIMARY CARE | Facility: CLINIC | Age: 20
End: 2025-07-31
Payer: COMMERCIAL

## 2025-08-06 ENCOUNTER — APPOINTMENT (OUTPATIENT)
Dept: PRIMARY CARE | Facility: CLINIC | Age: 20
End: 2025-08-06
Payer: COMMERCIAL

## 2025-08-06 VITALS
SYSTOLIC BLOOD PRESSURE: 121 MMHG | WEIGHT: 184.4 LBS | DIASTOLIC BLOOD PRESSURE: 81 MMHG | BODY MASS INDEX: 32.66 KG/M2 | HEART RATE: 96 BPM | OXYGEN SATURATION: 97 %

## 2025-08-06 DIAGNOSIS — E66.813 CLASS 3 SEVERE OBESITY DUE TO EXCESS CALORIES WITHOUT SERIOUS COMORBIDITY WITH BODY MASS INDEX (BMI) OF 40.0 TO 44.9 IN ADULT: Primary | ICD-10-CM

## 2025-08-06 PROCEDURE — 99213 OFFICE O/P EST LOW 20 MIN: CPT

## 2025-08-06 PROCEDURE — 1036F TOBACCO NON-USER: CPT

## 2025-08-06 RX ORDER — SEMAGLUTIDE 0.25 MG/.5ML
0.25 INJECTION, SOLUTION SUBCUTANEOUS WEEKLY
Qty: 2 ML | Refills: 0 | Status: SHIPPED | OUTPATIENT
Start: 2025-08-06 | End: 2025-08-28

## 2025-08-06 RX ORDER — PHENTERMINE HYDROCHLORIDE 37.5 MG/1
37.5 TABLET ORAL
Qty: 30 TABLET | Refills: 0 | Status: SHIPPED | OUTPATIENT
Start: 2025-08-06 | End: 2025-09-05

## 2025-08-06 ASSESSMENT — PAIN SCALES - GENERAL: PAINLEVEL_OUTOF10: 0-NO PAIN

## 2025-08-06 ASSESSMENT — PATIENT HEALTH QUESTIONNAIRE - PHQ9
1. LITTLE INTEREST OR PLEASURE IN DOING THINGS: NOT AT ALL
2. FEELING DOWN, DEPRESSED OR HOPELESS: NOT AT ALL
SUM OF ALL RESPONSES TO PHQ9 QUESTIONS 1 AND 2: 0

## 2025-08-06 ASSESSMENT — ENCOUNTER SYMPTOMS
CONSTITUTIONAL NEGATIVE: 1
PSYCHIATRIC NEGATIVE: 1
HEMATOLOGIC/LYMPHATIC NEGATIVE: 1
MUSCULOSKELETAL NEGATIVE: 1
ALLERGIC/IMMUNOLOGIC NEGATIVE: 1
GASTROINTESTINAL NEGATIVE: 1
RESPIRATORY NEGATIVE: 1
CARDIOVASCULAR NEGATIVE: 1
NEUROLOGICAL NEGATIVE: 1

## 2025-08-06 NOTE — PROGRESS NOTES
Subjective   Patient ID: Magui Domingo is a 19 y.o. female who presents for Weight Check (Wants to discuss medication options.).  Today she is accompanied by alone.     HPI  Obesity  - she reported her concerns about her weight gain,  Magui reports she is so happy to finally be under 220 pounds.   - She stated that before she had her Nexplanon placed, she was 184#. She has now had it for three plus years and she is at 231#   - She has tried meal planning but because her schedule very erratic and it makes it hard for her to meal plan and get to the gym   - She has utilized apps to help track calories and has not been successful with her weight loss.   - She states this weight gain has affected her mental health to a degree.   - medication therapies were discussed to aid in the help with weight loss. She stated she would like to try an injectable while she continues to make healthier choices, but these were not covered. Also not covered was Contrave or Qsymia.     -She was started on Adipex in 2025  Weight before startin lbs.  BP before startin/78       - Weight today: 184 lbs.   BP today 121/81     - She denies side effects and would like to continue with Adipex.   - Will continue with Adipex and follow up in 1 month   - she got a new job and would like to try to get injectable medication if covered  --> start wegovy  --> continue adipex    Review of Systems   Constitutional: Negative.    HENT: Negative.     Respiratory: Negative.     Cardiovascular: Negative.    Gastrointestinal: Negative.    Genitourinary: Negative.    Musculoskeletal: Negative.    Skin: Negative.    Allergic/Immunologic: Negative.    Neurological: Negative.    Hematological: Negative.    Psychiatric/Behavioral: Negative.         Objective   /81   Pulse 96   Wt 83.6 kg (184 lb 6.4 oz)   SpO2 97%   BMI 32.66 kg/m²   BSA: 1.93 meters squared  Growth percentiles: No height on file for this encounter. 95 %ile (Z= 1.68)  based on [facility] [place] (Girls, 2-20 Years) weight-for-age data using data from 8/6/2025.     Physical Exam  Vitals and nursing note reviewed.   Constitutional:       Appearance: Normal appearance. She is normal weight.   HENT:      Head: Normocephalic.      Nose: Nose normal.      Mouth/Throat:      Mouth: Mucous membranes are moist.      Pharynx: Oropharynx is clear.     Eyes:      Extraocular Movements: Extraocular movements intact.      Conjunctiva/sclera: Conjunctivae normal.      Pupils: Pupils are equal, round, and reactive to light.       Cardiovascular:      Rate and Rhythm: Normal rate and regular rhythm.      Pulses: Normal pulses.      Heart sounds: Normal heart sounds.   Pulmonary:      Effort: Pulmonary effort is normal.      Breath sounds: Normal breath sounds.   Abdominal:      General: Abdomen is flat. Bowel sounds are normal.      Palpations: Abdomen is soft.     Musculoskeletal:         General: Normal range of motion.      Cervical back: Normal range of motion and neck supple.     Skin:     General: Skin is warm and dry.      Capillary Refill: Capillary refill takes less than 2 seconds.     Neurological:      General: No focal deficit present.      Mental Status: She is alert. Mental status is at baseline.     Psychiatric:         Mood and Affect: Mood normal.         Behavior: Behavior normal.         Thought Content: Thought content normal.         Judgment: Judgment normal.       /81   Pulse 96   Wt 83.6 kg (184 lb 6.4 oz)   SpO2 97%   BMI 32.66 kg/m²    Lab Results   Component Value Date    GLUCOSE 83 10/09/2024    CALCIUM 9.1 10/09/2024     10/09/2024    K 4.1 10/09/2024    CO2 26 10/09/2024     10/09/2024    BUN 9 10/09/2024    CREATININE 0.69 10/09/2024        Assessment/Plan   Obesity  --> start wegovy  --> continue adipex    Problem List Items Addressed This Visit    None  Visit Diagnoses         Class 3 severe obesity due to excess calories without serious comorbidity with  body mass index (BMI) of 40.0 to 44.9 in adult    -  Primary    Relevant Medications    semaglutide, weight loss, (Wegovy) 0.25 mg/0.5 mL pen injector    phentermine (Adipex-P) 37.5 mg tablet        It was great to see you today!  Please continue taking your prescribed medications.   Refill have been sent to your pharmacy.    Start Wegovy  Continue adipex    RTC in 1 month

## 2025-08-26 DIAGNOSIS — E66.813 CLASS 3 SEVERE OBESITY DUE TO EXCESS CALORIES WITHOUT SERIOUS COMORBIDITY WITH BODY MASS INDEX (BMI) OF 40.0 TO 44.9 IN ADULT: ICD-10-CM

## 2025-08-26 RX ORDER — PHENTERMINE HYDROCHLORIDE 37.5 MG/1
37.5 TABLET ORAL
Qty: 30 TABLET | Refills: 0 | Status: SHIPPED | OUTPATIENT
Start: 2025-08-26 | End: 2025-09-25

## 2025-09-03 ENCOUNTER — APPOINTMENT (OUTPATIENT)
Dept: PRIMARY CARE | Facility: CLINIC | Age: 20
End: 2025-09-03
Payer: COMMERCIAL

## 2025-09-24 ENCOUNTER — APPOINTMENT (OUTPATIENT)
Dept: PRIMARY CARE | Facility: CLINIC | Age: 20
End: 2025-09-24
Payer: COMMERCIAL